# Patient Record
Sex: FEMALE | Race: WHITE | NOT HISPANIC OR LATINO | Employment: STUDENT | URBAN - METROPOLITAN AREA
[De-identification: names, ages, dates, MRNs, and addresses within clinical notes are randomized per-mention and may not be internally consistent; named-entity substitution may affect disease eponyms.]

---
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
## 2021-10-23 RX ORDER — NORGESTIMATE AND ETHINYL ESTRADIOL 0.25-0.035
KIT ORAL
COMMUNITY
End: 2021-11-17

## 2021-10-23 RX ORDER — NORELGESTROMIN AND ETHINYL ESTRADIOL 150; 35 UG/D; UG/D
PATCH TRANSDERMAL
COMMUNITY
Start: 2021-07-22 | End: 2023-10-02

## 2021-11-11 PROBLEM — A63.0 GENITAL WARTS: Status: ACTIVE | Noted: 2021-11-11

## 2022-01-05 PROBLEM — Z01.419 WELL WOMAN EXAM WITH ROUTINE GYNECOLOGICAL EXAM: Status: ACTIVE | Noted: 2022-01-05

## 2022-01-05 PROBLEM — S83.005A CLOSED DISLOCATION OF LEFT PATELLA: Status: RESOLVED | Noted: 2019-05-14 | Resolved: 2022-01-05

## 2022-03-14 PROBLEM — N90.89 VULVAR LESION: Status: ACTIVE | Noted: 2022-03-14

## 2022-03-14 PROBLEM — Z01.419 WELL WOMAN EXAM WITH ROUTINE GYNECOLOGICAL EXAM: Status: RESOLVED | Noted: 2022-01-05 | Resolved: 2022-03-14

## 2022-05-05 PROBLEM — L29.2 VULVAR ITCHING: Status: ACTIVE | Noted: 2022-05-05

## 2022-05-05 PROBLEM — K64.9 HEMORRHOIDS: Status: ACTIVE | Noted: 2022-05-05

## 2022-08-22 ENCOUNTER — OFFICE VISIT (OUTPATIENT)
Dept: SURGERY | Facility: CLINIC | Age: 22
End: 2022-08-22
Payer: COMMERCIAL

## 2022-08-22 VITALS — BODY MASS INDEX: 20.42 KG/M2 | WEIGHT: 126.56 LBS

## 2022-08-22 DIAGNOSIS — K64.9 HEMORRHOIDS, UNSPECIFIED HEMORRHOID TYPE: ICD-10-CM

## 2022-08-22 DIAGNOSIS — K59.00 CONSTIPATION, UNSPECIFIED CONSTIPATION TYPE: Primary | ICD-10-CM

## 2022-08-22 PROCEDURE — 99204 PR OFFICE/OUTPT VISIT, NEW, LEVL IV, 45-59 MIN: ICD-10-PCS | Mod: 25,S$GLB,, | Performed by: COLON & RECTAL SURGERY

## 2022-08-22 PROCEDURE — 1159F MED LIST DOCD IN RCRD: CPT | Mod: CPTII,S$GLB,, | Performed by: COLON & RECTAL SURGERY

## 2022-08-22 PROCEDURE — 99999 PR PBB SHADOW E&M-EST. PATIENT-LVL III: ICD-10-PCS | Mod: PBBFAC,,, | Performed by: COLON & RECTAL SURGERY

## 2022-08-22 PROCEDURE — 46600 DIAGNOSTIC ANOSCOPY SPX: CPT | Mod: S$GLB,,, | Performed by: COLON & RECTAL SURGERY

## 2022-08-22 PROCEDURE — 99204 OFFICE O/P NEW MOD 45 MIN: CPT | Mod: 25,S$GLB,, | Performed by: COLON & RECTAL SURGERY

## 2022-08-22 PROCEDURE — 99999 PR PBB SHADOW E&M-EST. PATIENT-LVL III: CPT | Mod: PBBFAC,,, | Performed by: COLON & RECTAL SURGERY

## 2022-08-22 PROCEDURE — 3008F BODY MASS INDEX DOCD: CPT | Mod: CPTII,S$GLB,, | Performed by: COLON & RECTAL SURGERY

## 2022-08-22 PROCEDURE — 1159F PR MEDICATION LIST DOCUMENTED IN MEDICAL RECORD: ICD-10-PCS | Mod: CPTII,S$GLB,, | Performed by: COLON & RECTAL SURGERY

## 2022-08-22 PROCEDURE — 46600 PR DIAG2STIC A2SCOPY: ICD-10-PCS | Mod: S$GLB,,, | Performed by: COLON & RECTAL SURGERY

## 2022-08-22 PROCEDURE — 3008F PR BODY MASS INDEX (BMI) DOCUMENTED: ICD-10-PCS | Mod: CPTII,S$GLB,, | Performed by: COLON & RECTAL SURGERY

## 2022-08-22 RX ORDER — SPIRONOLACTONE 100 MG/1
100 TABLET, FILM COATED ORAL DAILY
COMMUNITY
End: 2023-10-02

## 2022-08-22 NOTE — PROGRESS NOTES
History & Physical    SUBJECTIVE:     History of Present Illness:  Patient is a 21 y.o. female presents for evaluation of rectal bleeding and pain. She also believes she can feel an external lesion at time. The bleeding is only with bowel movements, both when wiping and in the toilet. The pain occurs only with bowel movements and feels like she is passing razor blades. The rectal symptoms have been occurring since last November, and her constipation has been worse over the past few months. She has a bowel movement about 2 times per month, has hard skinny stools, strains, drinks about 1 gallon of water, sits for about 30 minutes, takes Miralax about every other day and stool softeners daily, and uses toilet paper only. She had a colonoscopy in 2017 with unknown results and for an unknown reason per the patient. She has followed with GI for her constipation in the past and has taken high doses of Linzess without success although not consistently. She has an uncle with colon cancer. She does have associated abdominal pain when she has not have a bowel movement in a while. She denies fevers, chills, nausea, and vomiting.     Chief Complaint   Patient presents with    Hemorrhoids    Rectal Bleeding    Rectal Pain       Review of patient's allergies indicates:  No Known Allergies    Current Outpatient Medications   Medication Sig Dispense Refill    ISOtretinoin (ACCUTANE) 40 MG capsule Take 40 mg by mouth 2 (two) times daily.      norelgestromin-ethinyl estradiol (XULANE) 150-35 mcg/24 hr Xulane transdermal patch weekly 150-35 mcg/24 hr apply 1 patch by transdermal route once weekly for 3 weeks 7/22/2021  Active      acyclovir (ZOVIRAX) 400 MG tablet Take 1 tablet (400 mg total) by mouth every 8 (eight) hours. for 7 days 21 tablet 0    hydrocortisone 2.5 % cream Apply topically 2 (two) times daily. for 10 days 3.5 g 0    spironolactone (ALDACTONE) 100 MG tablet Take 100 mg by mouth once daily at 6am.       valACYclovir (VALTREX) 500 MG tablet Take 2 tablets (1,000 mg total) by mouth once daily. for 7 days 14 tablet 0     No current facility-administered medications for this visit.       Past Medical History:   Diagnosis Date    Herpes simplex 2/4/2021 3:25:25 PM    [facility] Historical - Quick Add: HSV (herpes simplex virus) infection-No Additional Notes    Herpes simplex 2/4/2021 3:25:25 PM    [facility] Historical - Quick Add: HSV (herpes simplex virus) infection-No Additional Notes    Herpes simplex virus (HSV) infection      No past surgical history on file.  Family History   Problem Relation Age of Onset    Ovarian cancer Paternal Grandmother     Breast cancer Maternal Grandmother     Cancer Maternal Grandmother      Social History     Tobacco Use    Smoking status: Never Smoker    Smokeless tobacco: Never Used   Substance Use Topics    Alcohol use: Yes    Drug use: Never        Review of Systems:  Review of Systems   Constitutional: Negative for activity change, appetite change, chills, fatigue, fever and unexpected weight change.   HENT: Negative for congestion, ear pain, sore throat and trouble swallowing.    Eyes: Negative for pain, redness and itching.   Respiratory: Negative for cough, shortness of breath, wheezing and stridor.    Cardiovascular: Negative for chest pain, palpitations and leg swelling.   Gastrointestinal: Positive for abdominal pain, anal bleeding, constipation and rectal pain. Negative for abdominal distention, diarrhea, nausea and vomiting.   Endocrine: Negative for cold intolerance, heat intolerance and polyuria.   Genitourinary: Negative for difficulty urinating, dysuria, flank pain, frequency, hematuria and urgency.   Musculoskeletal: Negative for gait problem, joint swelling and neck pain.   Skin: Negative for color change, pallor, rash and wound.   Allergic/Immunologic: Negative for environmental allergies and immunocompromised state.   Neurological: Negative for  dizziness, speech difficulty, weakness and numbness.   Hematological: Does not bruise/bleed easily.   Psychiatric/Behavioral: Negative for agitation, confusion and hallucinations.       OBJECTIVE:       Physical Exam:  Physical Exam  Vitals reviewed. Exam conducted with a chaperone present.   Constitutional:       General: She is not in acute distress.     Appearance: She is well-developed. She is not ill-appearing.   HENT:      Head: Normocephalic and atraumatic.      Right Ear: External ear normal.      Left Ear: External ear normal.   Eyes:      Extraocular Movements: Extraocular movements intact.      Conjunctiva/sclera: Conjunctivae normal.   Cardiovascular:      Rate and Rhythm: Normal rate.   Pulmonary:      Effort: Pulmonary effort is normal. No respiratory distress.   Abdominal:      General: There is no distension.      Palpations: Abdomen is soft.      Tenderness: There is no abdominal tenderness.   Genitourinary:     Comments: Anorectal: Small left posterior and right posterior soft, non-thrombosed external hemorrhoids with minimal TTP. ELAINA with good tone, no blood, and no palpable internal lesions.   Musculoskeletal:      Cervical back: Neck supple.   Skin:     General: Skin is warm and dry.   Neurological:      Mental Status: She is alert and oriented to person, place, and time.   Psychiatric:         Behavior: Behavior normal.       Anoscopy Procedure Note    Pre-procedure diagnosis: Rectal bleeding    Post-procedure diagnosis: Internal hemorrhoids    Procedure: Anoscopy    Surgeon: Randolph Moses MD    Assistant: Jimena Arreola PA-C    Specimen: none    Findings: Anoscope inserted and all 4 quadrants examined. Mildly enlarged right anterior, right posterior, and left lateral internal hemorrhoids without evidence of recent bleeding. No other internal masses or lesions.       ASSESSMENT/PLAN:     20 y/o female with internal/external hemorrhoidal disease and chronic constipation.     - Discussed  causes and treatment of hemorrhoidal disease. Advised that the most important step in improving her rectal symptoms is controlling her chronic constipation. Would not recommend aggressive interventions on her hemorrhoids such as banding or surgery as would need to treat underlying cause (constipation) first  - Refer to GI for constipation work-up.  - Discussed that a minimum I would recommend behavioral, lifestyle and medication modifications to improve bowel habits. Usual bowel management handout given to patient. This includes a stool softener twice per day, fiber powder supplementation daily, drinking at least 64oz of water/day, avoiding straining with bowel movements, spending less than 5 min on toilet per bowel movement, eating a high fiber diet, using miralax as needed to achieve a bowel movement daily and using wet wipes to wipe after bowel movements when irritated.   - RTC PRN    Randolph Moses MD  Colon and Rectal Surgery  [facility]

## 2022-08-25 ENCOUNTER — OFFICE VISIT (OUTPATIENT)
Dept: GASTROENTEROLOGY | Facility: CLINIC | Age: 22
End: 2022-08-25
Payer: COMMERCIAL

## 2022-08-25 VITALS
SYSTOLIC BLOOD PRESSURE: 118 MMHG | BODY MASS INDEX: 20.35 KG/M2 | HEIGHT: 66 IN | DIASTOLIC BLOOD PRESSURE: 84 MMHG | WEIGHT: 126.63 LBS

## 2022-08-25 DIAGNOSIS — R14.0 ABDOMINAL BLOATING: ICD-10-CM

## 2022-08-25 DIAGNOSIS — K64.9 HEMORRHOIDS, UNSPECIFIED HEMORRHOID TYPE: ICD-10-CM

## 2022-08-25 DIAGNOSIS — K59.00 CONSTIPATION, UNSPECIFIED CONSTIPATION TYPE: Primary | ICD-10-CM

## 2022-08-25 DIAGNOSIS — K62.5 RECTAL BLEEDING: ICD-10-CM

## 2022-08-25 PROCEDURE — 99204 PR OFFICE/OUTPT VISIT, NEW, LEVL IV, 45-59 MIN: ICD-10-PCS | Mod: S$GLB,,, | Performed by: INTERNAL MEDICINE

## 2022-08-25 PROCEDURE — 1159F MED LIST DOCD IN RCRD: CPT | Mod: CPTII,S$GLB,, | Performed by: INTERNAL MEDICINE

## 2022-08-25 PROCEDURE — 99999 PR PBB SHADOW E&M-EST. PATIENT-LVL IV: CPT | Mod: PBBFAC,,, | Performed by: INTERNAL MEDICINE

## 2022-08-25 PROCEDURE — 1159F PR MEDICATION LIST DOCUMENTED IN MEDICAL RECORD: ICD-10-PCS | Mod: CPTII,S$GLB,, | Performed by: INTERNAL MEDICINE

## 2022-08-25 PROCEDURE — 99204 OFFICE O/P NEW MOD 45 MIN: CPT | Mod: S$GLB,,, | Performed by: INTERNAL MEDICINE

## 2022-08-25 PROCEDURE — 3074F PR MOST RECENT SYSTOLIC BLOOD PRESSURE < 130 MM HG: ICD-10-PCS | Mod: CPTII,S$GLB,, | Performed by: INTERNAL MEDICINE

## 2022-08-25 PROCEDURE — 3079F DIAST BP 80-89 MM HG: CPT | Mod: CPTII,S$GLB,, | Performed by: INTERNAL MEDICINE

## 2022-08-25 PROCEDURE — 3008F BODY MASS INDEX DOCD: CPT | Mod: CPTII,S$GLB,, | Performed by: INTERNAL MEDICINE

## 2022-08-25 PROCEDURE — 99999 PR PBB SHADOW E&M-EST. PATIENT-LVL IV: ICD-10-PCS | Mod: PBBFAC,,, | Performed by: INTERNAL MEDICINE

## 2022-08-25 PROCEDURE — 3008F PR BODY MASS INDEX (BMI) DOCUMENTED: ICD-10-PCS | Mod: CPTII,S$GLB,, | Performed by: INTERNAL MEDICINE

## 2022-08-25 PROCEDURE — 3074F SYST BP LT 130 MM HG: CPT | Mod: CPTII,S$GLB,, | Performed by: INTERNAL MEDICINE

## 2022-08-25 PROCEDURE — 3079F PR MOST RECENT DIASTOLIC BLOOD PRESSURE 80-89 MM HG: ICD-10-PCS | Mod: CPTII,S$GLB,, | Performed by: INTERNAL MEDICINE

## 2022-08-25 RX ORDER — LUBIPROSTONE 24 UG/1
24 CAPSULE ORAL 2 TIMES DAILY
Qty: 60 CAPSULE | Refills: 2 | Status: ON HOLD | OUTPATIENT
Start: 2022-08-25 | End: 2022-08-30 | Stop reason: HOSPADM

## 2022-08-25 RX ORDER — SODIUM, POTASSIUM,MAG SULFATES 17.5-3.13G
1 SOLUTION, RECONSTITUTED, ORAL ORAL DAILY
Qty: 1 KIT | Refills: 0 | Status: SHIPPED | OUTPATIENT
Start: 2022-08-25 | End: 2022-08-27

## 2022-08-25 NOTE — PROGRESS NOTES
[facility]  Gastroenterology    Patient evaluated at the request of Jimena Arreola PA-C  [address] [facility] [place] [address]    PCP: Brown Porter MD    8/25/22    Reason for Visit: Constipation    Subjective:   Kiersten Ochoa is a 21 y.o. female here for evaluation of constipation. States she has had issues with constipation for years but worsened over the past year. Has a Bm once every 1-2 weeks and that is with OTC laxatives and enemas. She takes Miralax and stool softeners daily. +abdominal pain when constipation is severe. +abdominal bloating after eating. Has tried Linzess in the past with up titration of dose and it lost effect. Also had a colonoscopy a few years ago but she cannot remember why because her constipation was not that bad. Thinks may be due to FH of colon issues in an uncle. No prior EGD. Also with some new onset rectal bleeding- happens at times with BMs and fills the toilet bowl. She saw CRS and was found to have hemorrhoids.       Past Medical History:   Diagnosis Date    Herpes simplex 2/4/2021 3:25:25 PM    [facility] Historical - Quick Add: HSV (herpes simplex virus) infection-No Additional Notes    Herpes simplex 2/4/2021 3:25:25 PM    [facility] Historical - Quick Add: HSV (herpes simplex virus) infection-No Additional Notes    Herpes simplex virus (HSV) infection        No past surgical history on file.    Current Outpatient Medications on File Prior to Visit   Medication Sig Dispense Refill    acyclovir (ZOVIRAX) 400 MG tablet Take 1 tablet (400 mg total) by mouth every 8 (eight) hours. for 7 days 21 tablet 0    hydrocortisone 2.5 % cream Apply topically 2 (two) times daily. for 10 days 3.5 g 0    norelgestromin-ethinyl estradiol (XULANE) 150-35 mcg/24 hr Xulane transdermal patch weekly 150-35 mcg/24 hr apply 1 patch by transdermal route once weekly for 3 weeks 7/22/2021  Active      spironolactone (ALDACTONE) 100 MG tablet Take 100 mg by  mouth once daily at 6am.      ISOtretinoin (ACCUTANE) 40 MG capsule Take 40 mg by mouth 2 (two) times daily.      [DISCONTINUED] valACYclovir (VALTREX) 500 MG tablet Take 2 tablets (1,000 mg total) by mouth once daily. for 7 days 14 tablet 0     No current facility-administered medications on file prior to visit.       Review of patient's allergies indicates:  No Known Allergies    Social History     Socioeconomic History    Marital status: Single   Tobacco Use    Smoking status: Never Smoker    Smokeless tobacco: Never Used   Substance and Sexual Activity    Alcohol use: Yes    Drug use: Never    Sexual activity: Yes     Partners: Male     Birth control/protection: Patch       Family History   Problem Relation Age of Onset    Ovarian cancer Paternal Grandmother     Breast cancer Maternal Grandmother     Cancer Maternal Grandmother        Review of Systems   Constitutional: Negative for appetite change, fever and unexpected weight change.   HENT: Negative for postnasal drip, rhinorrhea, sneezing, sore throat and trouble swallowing.    Eyes: Negative for visual disturbance.   Respiratory: Negative for cough, shortness of breath and wheezing.    Cardiovascular: Negative for chest pain, palpitations and leg swelling.   Gastrointestinal: Positive for abdominal distention, abdominal pain, anal bleeding and constipation. Negative for blood in stool, diarrhea, nausea and vomiting.   Genitourinary: Negative for dysuria.   Musculoskeletal: Negative for arthralgias, joint swelling and myalgias.   Skin: Negative for color change, pallor and rash.   Neurological: Negative for weakness, light-headedness, numbness and headaches.   Hematological: Negative for adenopathy. Does not bruise/bleed easily.   Psychiatric/Behavioral: Negative for agitation.           Objective:   Vitals:   Vitals:    08/25/22 0750   BP: 118/84       Physical Exam  Vitals reviewed.   Constitutional:       General: She is not in acute distress.      Appearance: She is not diaphoretic.   HENT:      Head: Normocephalic and atraumatic.      Mouth/Throat:      Pharynx: No oropharyngeal exudate.   Eyes:      General: No scleral icterus.        Right eye: No discharge.         Left eye: No discharge.      Conjunctiva/sclera: Conjunctivae normal.      Pupils: Pupils are equal, round, and reactive to light.   Cardiovascular:      Rate and Rhythm: Normal rate and regular rhythm.      Heart sounds: Normal heart sounds. No murmur heard.    No friction rub. No gallop.   Pulmonary:      Effort: Pulmonary effort is normal. No respiratory distress.      Breath sounds: Normal breath sounds. No stridor. No wheezing or rales.   Abdominal:      General: Bowel sounds are normal. There is no distension.      Palpations: Abdomen is soft. There is no mass.      Tenderness: There is no abdominal tenderness. There is no guarding.   Musculoskeletal:         General: Normal range of motion.      Cervical back: Normal range of motion.   Skin:     General: Skin is warm and dry.      Coloration: Skin is not pale.      Findings: No erythema or rash.   Neurological:      Mental Status: She is alert and oriented to person, place, and time.         IMPRESSION     Problem List Items Addressed This Visit        GI    Hemorrhoids      Other Visit Diagnoses     Constipation, unspecified constipation type    -  Primary    Relevant Orders    Ambulatory referral/consult to Endo Procedure     Case Request Endoscopy: EGD (ESOPHAGOGASTRODUODENOSCOPY), COLONOSCOPY (Completed)    Rectal bleeding        Relevant Orders    Ambulatory referral/consult to Endo Procedure     Case Request Endoscopy: EGD (ESOPHAGOGASTRODUODENOSCOPY), COLONOSCOPY (Completed)    Abdominal bloating        Relevant Orders    Ambulatory referral/consult to Endo Procedure     Case Request Endoscopy: EGD (ESOPHAGOGASTRODUODENOSCOPY), COLONOSCOPY (Completed)          PLANS:    - Failed Miralax, mag citrate,  enemas and Linzess. Start Amitiza 24 mcg po BID. Possible side effects of diarrhea and/or abdominal cramping discussed  - Schedule for EGD and colonoscopy (EGD for postprandial bloating). Will do Suprep with extended prep  - Discussed pending endoscopy results, may need further work-up of constipation with anorectal manometry  - Continue with high fiber intake and plenty of fluid intake  - Further recommendations pending the above    Constipation, unspecified constipation type  -     Ambulatory referral/consult to Gastroenterology  -     Ambulatory referral/consult to Endo Procedure ; Future; Expected date: 08/26/2022  -     Case Request Endoscopy: EGD (ESOPHAGOGASTRODUODENOSCOPY), COLONOSCOPY    Rectal bleeding  -     Ambulatory referral/consult to Endo Procedure ; Future; Expected date: 08/26/2022  -     Case Request Endoscopy: EGD (ESOPHAGOGASTRODUODENOSCOPY), COLONOSCOPY    Abdominal bloating  -     Ambulatory referral/consult to Endo Procedure ; Future; Expected date: 08/26/2022  -     Case Request Endoscopy: EGD (ESOPHAGOGASTRODUODENOSCOPY), COLONOSCOPY    Hemorrhoids, unspecified hemorrhoid type    Other orders  -     sodium,potassium,mag sulfates (SUPREP BOWEL PREP KIT) 17.5-3.13-1.6 gram SolR; Take 177 mLs by mouth once daily. for 2 days  Dispense: 1 kit; Refill: 0  -     lubiprostone (AMITIZA) 24 MCG Cap; Take 1 capsule (24 mcg total) by mouth 2 (two) times daily.  Dispense: 60 capsule; Refill: 2      Iram Back MD  Gastroenterology and Hepatology

## 2022-08-29 ENCOUNTER — ANESTHESIA EVENT (OUTPATIENT)
Dept: ENDOSCOPY | Facility: HOSPITAL | Age: 22
End: 2022-08-29
Payer: COMMERCIAL

## 2022-08-30 ENCOUNTER — ANESTHESIA (OUTPATIENT)
Dept: ENDOSCOPY | Facility: HOSPITAL | Age: 22
End: 2022-08-30
Payer: COMMERCIAL

## 2022-08-30 ENCOUNTER — HOSPITAL ENCOUNTER (OUTPATIENT)
Facility: HOSPITAL | Age: 22
Discharge: HOME | End: 2022-08-30
Attending: INTERNAL MEDICINE | Admitting: INTERNAL MEDICINE
Payer: COMMERCIAL

## 2022-08-30 DIAGNOSIS — K62.5 RECTAL BLEEDING: ICD-10-CM

## 2022-08-30 DIAGNOSIS — R14.0 POSTPRANDIAL ABDOMINAL BLOATING: ICD-10-CM

## 2022-08-30 DIAGNOSIS — K59.04 CHRONIC IDIOPATHIC CONSTIPATION: Primary | ICD-10-CM

## 2022-08-30 LAB
B-HCG UR QL: NEGATIVE
CTP QC/QA: YES
CTP QC/QA: YES
SARS-COV-2 AG RESP QL IA.RAPID: NEGATIVE

## 2022-08-30 PROCEDURE — 88305 TISSUE EXAM BY PATHOLOGIST: CPT | Mod: 26,,, | Performed by: PATHOLOGY

## 2022-08-30 PROCEDURE — 43239 EGD BIOPSY SINGLE/MULTIPLE: CPT | Performed by: INTERNAL MEDICINE

## 2022-08-30 PROCEDURE — 45378 PR COLONOSCOPY,DIAGNOSTIC: ICD-10-PCS | Mod: ,,, | Performed by: INTERNAL MEDICINE

## 2022-08-30 PROCEDURE — 25000003 PHARM REV CODE 250: Performed by: NURSE ANESTHETIST, CERTIFIED REGISTERED

## 2022-08-30 PROCEDURE — 88305 TISSUE EXAM BY PATHOLOGIST: ICD-10-PCS | Mod: 26,,, | Performed by: PATHOLOGY

## 2022-08-30 PROCEDURE — 37000009 HC ANESTHESIA EA ADD 15 MINS: Performed by: INTERNAL MEDICINE

## 2022-08-30 PROCEDURE — 43239 EGD BIOPSY SINGLE/MULTIPLE: CPT | Mod: 51,,, | Performed by: INTERNAL MEDICINE

## 2022-08-30 PROCEDURE — 27201012 HC FORCEPS, HOT/COLD, DISP: Performed by: INTERNAL MEDICINE

## 2022-08-30 PROCEDURE — 37000008 HC ANESTHESIA 1ST 15 MINUTES: Performed by: INTERNAL MEDICINE

## 2022-08-30 PROCEDURE — 45378 DIAGNOSTIC COLONOSCOPY: CPT | Mod: ,,, | Performed by: INTERNAL MEDICINE

## 2022-08-30 PROCEDURE — 63600175 PHARM REV CODE 636 W HCPCS: Performed by: NURSE ANESTHETIST, CERTIFIED REGISTERED

## 2022-08-30 PROCEDURE — 43239 PR EGD, FLEX, W/BIOPSY, SGL/MULTI: ICD-10-PCS | Mod: 51,,, | Performed by: INTERNAL MEDICINE

## 2022-08-30 PROCEDURE — 81025 URINE PREGNANCY TEST: CPT | Performed by: INTERNAL MEDICINE

## 2022-08-30 PROCEDURE — 45378 DIAGNOSTIC COLONOSCOPY: CPT | Performed by: INTERNAL MEDICINE

## 2022-08-30 PROCEDURE — 88305 TISSUE EXAM BY PATHOLOGIST: CPT | Performed by: PATHOLOGY

## 2022-08-30 RX ORDER — LIDOCAINE HYDROCHLORIDE 10 MG/ML
INJECTION, SOLUTION EPIDURAL; INFILTRATION; INTRACAUDAL; PERINEURAL
Status: DISCONTINUED | OUTPATIENT
Start: 2022-08-30 | End: 2022-08-30

## 2022-08-30 RX ORDER — SODIUM CHLORIDE, SODIUM LACTATE, POTASSIUM CHLORIDE, CALCIUM CHLORIDE 600; 310; 30; 20 MG/100ML; MG/100ML; MG/100ML; MG/100ML
INJECTION, SOLUTION INTRAVENOUS CONTINUOUS
Status: DISCONTINUED | OUTPATIENT
Start: 2022-08-30 | End: 2022-08-30 | Stop reason: HOSPADM

## 2022-08-30 RX ORDER — PLECANATIDE 3 MG/1
3 TABLET ORAL DAILY
Qty: 30 TABLET | Refills: 3 | Status: SHIPPED | OUTPATIENT
Start: 2022-08-30 | End: 2022-09-29

## 2022-08-30 RX ORDER — SODIUM CHLORIDE 9 MG/ML
INJECTION, SOLUTION INTRAVENOUS CONTINUOUS
Status: DISCONTINUED | OUTPATIENT
Start: 2022-08-30 | End: 2022-08-30 | Stop reason: HOSPADM

## 2022-08-30 RX ORDER — PROPOFOL 10 MG/ML
VIAL (ML) INTRAVENOUS
Status: DISCONTINUED | OUTPATIENT
Start: 2022-08-30 | End: 2022-08-30

## 2022-08-30 RX ADMIN — PROPOFOL 40 MG: 10 INJECTION, EMULSION INTRAVENOUS at 10:08

## 2022-08-30 RX ADMIN — GLYCOPYRROLATE 0.2 MG: 0.2 INJECTION, SOLUTION INTRAMUSCULAR; INTRAVITREAL at 10:08

## 2022-08-30 RX ADMIN — LIDOCAINE HYDROCHLORIDE 50 MG: 10 INJECTION, SOLUTION EPIDURAL; INFILTRATION; INTRACAUDAL; PERINEURAL at 10:08

## 2022-08-30 RX ADMIN — PROPOFOL 100 MG: 10 INJECTION, EMULSION INTRAVENOUS at 10:08

## 2022-08-30 RX ADMIN — SODIUM CHLORIDE, SODIUM LACTATE, POTASSIUM CHLORIDE, AND CALCIUM CHLORIDE: .6; .31; .03; .02 INJECTION, SOLUTION INTRAVENOUS at 10:08

## 2022-08-30 NOTE — [employer]OVATION PATIENT INSTRUCTIONS
Discharge Summary/Instructions after an Endoscopic Procedure  Patient Name: Kiersten Ochoa  Patient MRN: [id number]  Patient YOB: 2000 Tuesday, August 30, 2022 Iram Back MD  Dear patient,  As a result of recent federal legislation (The Federal Cures Act), you may   receive lab or pathology results from your procedure in your [facility]   account before your physician is able to contact you. Your physician or   their representative will relay the results to you with their   recommendations at their soonest availability.  Thank you,  RESTRICTIONS:  During your procedure today, you received medications for sedation.  These   medications may affect your judgment, balance and coordination.  Therefore,   for 24 hours, you have the following restrictions:   - DO NOT drive a car, operate machinery, make legal/financial decisions,   sign important papers or drink alcohol.    ACTIVITY:  Today: no heavy lifting, straining or running due to procedural   sedation/anesthesia.  The following day: return to full activity including work.  DIET:  Eat and drink normally unless instructed otherwise.     TREATMENT FOR COMMON SIDE EFFECTS:  - Mild abdominal pain, nausea, belching, bloating or excessive gas:  rest,   eat lightly and use a heating pad.  - Sore Throat: treat with throat lozenges and/or gargle with warm salt   water.  - Because air was used during the procedure, expelling large amounts of air   from your rectum or belching is normal.  - If a bowel prep was taken, you may not have a bowel movement for 1-3 days.    This is normal.  SYMPTOMS TO WATCH FOR AND REPORT TO YOUR PHYSICIAN:  1. Abdominal pain or bloating, other than gas cramps.  2. Chest pain.  3. Back pain.  4. Signs of infection such as: chills or fever occurring within 24 hours   after the procedure.  5. Rectal bleeding, which would show as bright red, maroon, or black stools.   (A tablespoon of blood from the rectum is not serious, especially  if   hemorrhoids are present.)  6. Vomiting.  7. Weakness or dizziness.  GO DIRECTLY TO THE NEAREST EMERGENCY ROOM IF YOU HAVE ANY OF THE FOLLOWING:      Difficulty breathing              Chills and/or fever over 101 F   Persistent vomiting and/or vomiting blood   Severe abdominal pain   Severe chest pain   Black, tarry stools   Bleeding- more than one tablespoon   Any other symptom or condition that you feel may need urgent attention  Your doctor recommends these additional instructions:  If any biopsies were taken, your doctors clinic will contact you in 1 to 2   weeks with any results.  - Discharge patient to home (via wheelchair).   - Resume previous diet.   - Continue present medications.   - Repeat colonoscopy at age 46 for screening purposes.   - Patient has a contact number available for emergencies.  The signs and   symptoms of potential delayed complications were discussed with the   patient.  Return to normal activities tomorrow.  Written discharge   instructions were provided to the patient.  For questions, problems or results please call your physician Iram Back MD at Work:  [phone]  If you have any questions about the above instructions, call the GI   department at [phone] or call the endoscopy unit at [phone]   from 7am until 3 pm.  [facility], EMERGENCY ROOM PHONE NUMBER:   [phone]  IF A COMPLICATION OR EMERGENCY SITUATION ARISES AND YOU ARE UNABLE TO REACH   YOUR PHYSICIAN - GO DIRECTLY TO THE EMERGENCY ROOM.  I have read or have had read to me these discharge instructions for my   procedure and have received a written copy.  I understand these   instructions and will follow-up with my physician if I have any questions.     __________________________________       _____________________________________  Nurse Signature                                          Patient/Designated   Responsible Party Signature  MD Iram Dyson  IRENE Back MD  8/30/2022 11:06:32 AM  [employer]

## 2022-08-30 NOTE — PROVATION PATIENT INSTRUCTIONS
Discharge Summary/Instructions after an Endoscopic Procedure  Patient Name: Kiersten Ochoa  Patient MRN: [id number]  Patient YOB: 2000 Tuesday, August 30, 2022 Iram Back MD  Dear patient,  As a result of recent federal legislation (The Federal Cures Act), you may   receive lab or pathology results from your procedure in your [facility]   account before your physician is able to contact you. Your physician or   their representative will relay the results to you with their   recommendations at their soonest availability.  Thank you,  RESTRICTIONS:  During your procedure today, you received medications for sedation.  These   medications may affect your judgment, balance and coordination.  Therefore,   for 24 hours, you have the following restrictions:   - DO NOT drive a car, operate machinery, make legal/financial decisions,   sign important papers or drink alcohol.    ACTIVITY:  Today: no heavy lifting, straining or running due to procedural   sedation/anesthesia.  The following day: return to full activity including work.  DIET:  Eat and drink normally unless instructed otherwise.     TREATMENT FOR COMMON SIDE EFFECTS:  - Mild abdominal pain, nausea, belching, bloating or excessive gas:  rest,   eat lightly and use a heating pad.  - Sore Throat: treat with throat lozenges and/or gargle with warm salt   water.  - Because air was used during the procedure, expelling large amounts of air   from your rectum or belching is normal.  - If a bowel prep was taken, you may not have a bowel movement for 1-3 days.    This is normal.  SYMPTOMS TO WATCH FOR AND REPORT TO YOUR PHYSICIAN:  1. Abdominal pain or bloating, other than gas cramps.  2. Chest pain.  3. Back pain.  4. Signs of infection such as: chills or fever occurring within 24 hours   after the procedure.  5. Rectal bleeding, which would show as bright red, maroon, or black stools.   (A tablespoon of blood from the rectum is not serious, especially  if   hemorrhoids are present.)  6. Vomiting.  7. Weakness or dizziness.  GO DIRECTLY TO THE NEAREST EMERGENCY ROOM IF YOU HAVE ANY OF THE FOLLOWING:      Difficulty breathing              Chills and/or fever over 101 F   Persistent vomiting and/or vomiting blood   Severe abdominal pain   Severe chest pain   Black, tarry stools   Bleeding- more than one tablespoon   Any other symptom or condition that you feel may need urgent attention  Your doctor recommends these additional instructions:  If any biopsies were taken, your doctors clinic will contact you in 1 to 2   weeks with any results.  - Discharge patient to home (via wheelchair).   - Resume previous diet.   - Continue present medications.   - Await pathology results.   - Telephone GI clinic for pathology results in 2 weeks.   - Patient has a contact number available for emergencies.  The signs and   symptoms of potential delayed complications were discussed with the   patient.  Return to normal activities tomorrow.  Written discharge   instructions were provided to the patient.  For questions, problems or results please call your physician Iram Back MD at Work:  [phone]  If you have any questions about the above instructions, call the GI   department at [phone] or call the endoscopy unit at [phone]   from 7am until 3 pm.  [facility], EMERGENCY ROOM PHONE NUMBER:   [phone]  IF A COMPLICATION OR EMERGENCY SITUATION ARISES AND YOU ARE UNABLE TO REACH   YOUR PHYSICIAN - GO DIRECTLY TO THE EMERGENCY ROOM.  I have read or have had read to me these discharge instructions for my   procedure and have received a written copy.  I understand these   instructions and will follow-up with my physician if I have any questions.     __________________________________       _____________________________________  Nurse Signature                                          Patient/Designated   Responsible Party  Signature  MD Iram Dyson MD  8/30/2022 11:07:59 AM  [employer]

## 2022-08-30 NOTE — ANESTHESIA POSTPROCEDURE EVALUATION
Anesthesia Post Evaluation    Patient: Kiersten Ochoa    Procedure(s) Performed: Procedure(s) (LRB):  EGD (ESOPHAGOGASTRODUODENOSCOPY) (N/A)  COLONOSCOPY (N/A)    Final Anesthesia Type: MAC      Patient location during evaluation: PACU  Patient participation: Yes- Able to Participate  Level of consciousness: awake and alert  Post-procedure vital signs: reviewed and stable  Pain management: adequate  Airway patency: patent    PONV status at discharge: No PONV  Anesthetic complications: no      Cardiovascular status: blood pressure returned to baseline  Respiratory status: unassisted and room air  Hydration status: euvolemic  Follow-up not needed.          Vitals Value Taken Time   /75 08/30/22 1000   Temp 37.2 °C (99 °F) 08/30/22 1000   Pulse 67 08/30/22 1000   Resp 12 08/30/22 1000   SpO2 99 % 08/30/22 1000         No case tracking events are documented in the log.      Pain/Sean Score: No data recorded

## 2022-08-30 NOTE — TRANSFER OF CARE
"Anesthesia Transfer of Care Note    Patient: Kiersten Ochoa    Procedure(s) Performed: Procedure(s) (LRB):  EGD (ESOPHAGOGASTRODUODENOSCOPY) (N/A)  COLONOSCOPY (N/A)    Patient location: PACU    Anesthesia Type: MAC    Transport from OR: Transported from OR on room air with adequate spontaneous ventilation    Post pain: adequate analgesia    Post assessment: no apparent anesthetic complications    Post vital signs: stable    Level of consciousness: responds to stimulation    Nausea/Vomiting: no nausea/vomiting    Complications: none    Transfer of care protocol was followed      Last vitals:   Visit Vitals  /75 (BP Location: Left arm, Patient Position: Lying)   Pulse 67   Temp 37.2 °C (99 °F)   Resp 12   Ht 5' 6" (1.676 m)   Wt 56.7 kg (125 lb)   LMP 07/28/2022 (Approximate)   SpO2 99%   Breastfeeding No   BMI 20.18 kg/m²     "

## 2022-08-30 NOTE — H&P
Short Stay Endoscopy History and Physical    PCP - Brown Porter MD    Procedure - EGD and colonoscopy  ASA - 1  Mallampati - per anesthesia  History of Anesthesia problems - no  Family history Anesthesia problems -  no     HPI:  This is a 21 y.o. female here for evaluation of :   Active Hospital Problems    Diagnosis  POA    *Chronic idiopathic constipation [K59.04]  No    Postprandial abdominal bloating [R14.0]  No    Rectal bleeding [K62.5]  No      Resolved Hospital Problems   No resolved problems to display.         Health Maintenance         Date Due Completion Date    Hepatitis C Screening Never done ---    Lipid Panel Never done ---    COVID-19 Vaccine (1) Never done ---    HIV Screening Never done ---    HPV Vaccines (3 - 3-dose series) 05/11/2022 1/11/2022    Influenza Vaccine (1) 09/01/2022 ---    Chlamydia Screening 05/05/2023 5/5/2022    TETANUS VACCINE 07/29/2023 7/29/2013            ROS:  CONSTITUTIONAL: Denies weight change,  fatigue, fevers, chills, night sweats.  CARDIOVASCULAR: Denies chest pain, shortness of breath, orthopnea and edema.  RESPIRATORY: Denies cough, hemoptysis, dyspnea, and wheezing.  GI: See HPI.    Medical History:   Past Medical History:   Diagnosis Date    Herpes simplex 2/4/2021 3:25:25 PM    [facility] Historical - Quick Add: HSV (herpes simplex virus) infection-No Additional Notes    Herpes simplex 2/4/2021 3:25:25 PM    [facility] Historical - Quick Add: HSV (herpes simplex virus) infection-No Additional Notes    Herpes simplex virus (HSV) infection        Surgical History:   History reviewed. No pertinent surgical history.    Family History:   Family History   Problem Relation Age of Onset    Ovarian cancer Paternal Grandmother     Breast cancer Maternal Grandmother     Cancer Maternal Grandmother        Social History:   Social History     Tobacco Use    Smoking status: Never    Smokeless tobacco: Never   Substance Use Topics    Alcohol use: Yes    Drug use: Never        Allergies:   Review of patient's allergies indicates:  No Known Allergies    Medications:   No current facility-administered medications on file prior to encounter.     Current Outpatient Medications on File Prior to Encounter   Medication Sig Dispense Refill    spironolactone (ALDACTONE) 100 MG tablet Take 100 mg by mouth once daily at 6am.      acyclovir (ZOVIRAX) 400 MG tablet Take 1 tablet (400 mg total) by mouth every 8 (eight) hours. for 7 days 21 tablet 0    hydrocortisone 2.5 % cream Apply topically 2 (two) times daily. for 10 days 3.5 g 0    ISOtretinoin (ACCUTANE) 40 MG capsule Take 40 mg by mouth 2 (two) times daily.      lubiprostone (AMITIZA) 24 MCG Cap Take 1 capsule (24 mcg total) by mouth 2 (two) times daily. 60 capsule 2    norelgestromin-ethinyl estradiol (XULANE) 150-35 mcg/24 hr Xulane transdermal patch weekly 150-35 mcg/24 hr apply 1 patch by transdermal route once weekly for 3 weeks 7/22/2021  Active         Physical Exam:  Vital Signs:   Vitals:    08/30/22 1000   BP: 124/75   Pulse: 67   Resp: 12   Temp: 99 °F (37.2 °C)     General Appearance: Well appearing in no acute distress  ENT: OP clear  Chest: CTA B  CV: RRR, no m/r/g  Abd: s/nt/nd/nabs  Ext: no edema    Labs:Reviewed    IMP:  Active Hospital Problems    Diagnosis  POA    *Chronic idiopathic constipation [K59.04]  No    Postprandial abdominal bloating [R14.0]  No    Rectal bleeding [K62.5]  No      Resolved Hospital Problems   No resolved problems to display.         Plan:   I have explained the risks and benefits of upper endoscopy and colonoscopy to the patient including but not limited to bleeding, perforation, infection, and death. The patient wishes to proceed.

## 2022-08-30 NOTE — ANESTHESIA PREPROCEDURE EVALUATION
08/30/2022  Kiersten Ochoa is a 21 y.o., female.      Pre-op Assessment    I have reviewed the Patient Summary Reports.     I have reviewed the Nursing Notes. I have reviewed the NPO Status.   I have reviewed the Medications.     Review of Systems  Anesthesia Hx:  No problems with previous Anesthesia  Denies Family Hx of Anesthesia complications.   Denies Personal Hx of Anesthesia complications.   Social:  Non-Smoker    Hematology/Oncology:     Oncology Normal     EENT/Dental:EENT/Dental Normal   Cardiovascular:  Cardiovascular Normal Exercise tolerance: good     Pulmonary:  Pulmonary Normal    Renal/:  Renal/ Normal     Hepatic/GI:  Hepatic/GI Normal  Constipation, unspecified constipation type (K59.00)       Rectal bleeding (K62.5)       Abdominal bloating (R14.0)   Musculoskeletal:  Musculoskeletal Normal    Neurological:  Neurology Normal    Endocrine:  Endocrine Normal    Dermatological:  Skin Normal    Psych:  Psychiatric Normal           Physical Exam  General: Well nourished, Cooperative, Alert and Oriented    Airway:  Mallampati: II   Mouth Opening: Normal  TM Distance: Normal  Tongue: Normal  Neck ROM: Normal ROM    Dental:  Intact    Chest/Lungs:  Clear to auscultation, Normal Respiratory Rate    Heart:  Rate: Normal  Rhythm: Regular Rhythm        Anesthesia Plan  Type of Anesthesia, risks & benefits discussed:    Anesthesia Type: MAC  Intra-op Monitoring Plan: Standard ASA Monitors  Induction:  IV  Informed Consent: Informed consent signed with the Patient and all parties understand the risks and agree with anesthesia plan.  All questions answered.   ASA Score: 2  Day of Surgery Review of History & Physical: H&P Update referred to the surgeon/provider.I have interviewed and examined the patient. I have reviewed the patient's H&P dated: There are no significant changes.     Ready For  Surgery From Anesthesia Perspective.     .

## 2022-08-30 NOTE — DISCHARGE SUMMARY
[facility] - Endoscopy (Hospital)  Discharge Note  Short Stay    Procedure(s) (LRB):  EGD (ESOPHAGOGASTRODUODENOSCOPY) (N/A)  COLONOSCOPY (N/A)    OUTCOME: Patient tolerated treatment/procedure well without complication and is now ready for discharge.    DISPOSITION: Home or Self Care    FINAL DIAGNOSIS:  Chronic idiopathic constipation    FOLLOWUP: With primary care provider    DISCHARGE INSTRUCTIONS:  No discharge procedures on file.

## 2022-08-31 VITALS
TEMPERATURE: 99 F | SYSTOLIC BLOOD PRESSURE: 122 MMHG | HEIGHT: 66 IN | BODY MASS INDEX: 20.09 KG/M2 | OXYGEN SATURATION: 100 % | DIASTOLIC BLOOD PRESSURE: 57 MMHG | WEIGHT: 125 LBS | HEART RATE: 99 BPM | RESPIRATION RATE: 18 BRPM

## 2022-09-02 ENCOUNTER — TELEPHONE (OUTPATIENT)
Dept: PREADMISSION TESTING | Facility: HOSPITAL | Age: 22
End: 2022-09-02
Payer: COMMERCIAL

## 2022-09-02 ENCOUNTER — HOSPITAL ENCOUNTER (OUTPATIENT)
Dept: PREADMISSION TESTING | Facility: HOSPITAL | Age: 22
Discharge: HOME | End: 2022-09-02
Attending: INTERNAL MEDICINE
Payer: COMMERCIAL

## 2022-09-02 DIAGNOSIS — R14.0 ABDOMINAL BLOATING: ICD-10-CM

## 2022-09-02 DIAGNOSIS — K62.5 RECTAL BLEEDING: ICD-10-CM

## 2022-09-02 DIAGNOSIS — K59.00 CONSTIPATION, UNSPECIFIED CONSTIPATION TYPE: ICD-10-CM

## 2022-09-02 LAB
FINAL PATHOLOGIC DIAGNOSIS: NORMAL
GROSS: NORMAL
Lab: NORMAL

## 2022-09-02 NOTE — TELEPHONE ENCOUNTER
Called patient at pat appointment to schedule endoscopy procedures, she declined.  Stated she just had both procedures done.

## 2022-12-05 PROBLEM — K62.5 RECTAL BLEEDING: Status: RESOLVED | Noted: 2022-08-30 | Resolved: 2022-12-05

## 2022-12-29 PROBLEM — N63.0 BREAST LUMP: Status: ACTIVE | Noted: 2022-12-29

## 2022-12-29 PROBLEM — L29.2 VULVAR ITCHING: Status: RESOLVED | Noted: 2022-05-05 | Resolved: 2022-12-29

## 2022-12-29 PROBLEM — N90.89 VULVAR LESION: Status: RESOLVED | Noted: 2022-03-14 | Resolved: 2022-12-29

## 2023-10-02 PROBLEM — N76.0 ACUTE VAGINITIS: Status: ACTIVE | Noted: 2023-10-02

## 2024-12-06 PROBLEM — G89.29 CHRONIC PELVIC PAIN IN FEMALE: Status: ACTIVE | Noted: 2024-12-06

## 2024-12-06 PROBLEM — R30.0 DYSURIA: Status: ACTIVE | Noted: 2024-12-06

## 2024-12-06 PROBLEM — R10.2 CHRONIC PELVIC PAIN IN FEMALE: Status: ACTIVE | Noted: 2024-12-06

## 2024-12-06 PROBLEM — K59.00 CONSTIPATION: Status: ACTIVE | Noted: 2024-12-06

## 2024-12-06 PROBLEM — R32 UI (URINARY INCONTINENCE): Status: ACTIVE | Noted: 2024-12-06

## 2024-12-06 PROBLEM — K92.1 BLOODY STOOL: Status: ACTIVE | Noted: 2024-12-06

## 2024-12-09 ENCOUNTER — HOSPITAL ENCOUNTER (OUTPATIENT)
Dept: RADIOLOGY | Facility: HOSPITAL | Age: 24
Discharge: HOME | End: 2024-12-09
Attending: UROLOGY
Payer: COMMERCIAL

## 2024-12-09 ENCOUNTER — TELEPHONE (OUTPATIENT)
Dept: UROLOGY | Facility: CLINIC | Age: 24
End: 2024-12-09

## 2024-12-09 ENCOUNTER — PATIENT MESSAGE (OUTPATIENT)
Dept: UROLOGY | Facility: CLINIC | Age: 24
End: 2024-12-09

## 2024-12-09 ENCOUNTER — OFFICE VISIT (OUTPATIENT)
Dept: UROLOGY | Facility: CLINIC | Age: 24
End: 2024-12-09
Payer: COMMERCIAL

## 2024-12-09 VITALS — HEIGHT: 66 IN | BODY MASS INDEX: 18.63 KG/M2 | WEIGHT: 115.94 LBS

## 2024-12-09 DIAGNOSIS — R10.2 SUPRAPUBIC PAIN: ICD-10-CM

## 2024-12-09 DIAGNOSIS — N23 RENAL COLIC: ICD-10-CM

## 2024-12-09 DIAGNOSIS — R32 URINARY INCONTINENCE, UNSPECIFIED TYPE: Primary | ICD-10-CM

## 2024-12-09 LAB
BILIRUB UR QL STRIP: NEGATIVE
GLUCOSE UR QL STRIP: NEGATIVE
KETONES UR QL STRIP: NEGATIVE
LEUKOCYTE ESTERASE UR QL STRIP: NEGATIVE
PH, POC UA: 5.5
POC BLOOD, URINE: NEGATIVE
POC NITRATES, URINE: NEGATIVE
PROT UR QL STRIP: NEGATIVE
SP GR UR STRIP: 1.03 (ref 1–1.03)
UROBILINOGEN UR STRIP-ACNC: 0.2 (ref 0.1–1.1)

## 2024-12-09 PROCEDURE — 99999 PR PBB SHADOW E&M-EST. PATIENT-LVL III: CPT | Mod: PBBFAC,,, | Performed by: UROLOGY

## 2024-12-09 PROCEDURE — 81003 URINALYSIS AUTO W/O SCOPE: CPT | Mod: QW,S$GLB,, | Performed by: UROLOGY

## 2024-12-09 PROCEDURE — 3008F BODY MASS INDEX DOCD: CPT | Mod: CPTII,S$GLB,, | Performed by: UROLOGY

## 2024-12-09 PROCEDURE — 74176 CT ABD & PELVIS W/O CONTRAST: CPT | Mod: 26,,, | Performed by: RADIOLOGY

## 2024-12-09 PROCEDURE — 99204 OFFICE O/P NEW MOD 45 MIN: CPT | Mod: S$GLB,,, | Performed by: UROLOGY

## 2024-12-09 PROCEDURE — 1159F MED LIST DOCD IN RCRD: CPT | Mod: CPTII,S$GLB,, | Performed by: UROLOGY

## 2024-12-09 PROCEDURE — 74176 CT ABD & PELVIS W/O CONTRAST: CPT | Mod: TC

## 2024-12-09 RX ORDER — CEFDINIR 300 MG/1
300 CAPSULE ORAL 2 TIMES DAILY
Qty: 20 CAPSULE | Refills: 0 | Status: SHIPPED | OUTPATIENT
Start: 2024-12-09 | End: 2024-12-19

## 2024-12-09 NOTE — PROGRESS NOTES
Chief Complaint:   Encounter Diagnoses   Name Primary?    Urinary incontinence, unspecified type Yes    Suprapubic pain     Renal colic        HPI:  24-year-old female who comes in with acute episodes of right lower pelvic pain, bilateral renal colic pain, lower suprapubic discomfort.  She has recently been evaluated by her gynecologist, obstetric ultrasound was normal except for an ovarian cyst.  Patient states she is also having extreme pain with intercourse, which as suprapubic tenderness.  Patient notes this is severe, previous attempts with the same partner caused no such symptoms.  She has noted that she even has sharp pain in the suprapubic region with part cough or laughing, even if she has low-pressure upon this area.  Again this all started about a month ago, no real eliciting or inciting factors.  Patient notes that she has also noticed an increase in frequency with the urgency, occasional urge incontinence.  She is getting up 3 times per night to void now going about every hour and a half during the daytime.  No gross hematuria, no dysuria, no history of smoking, no previous problems such as this before.  She was in middle school she was diagnosed with what she states was spina bifida, but no neurological issues.  Patient's suffers from severe constipation is being evaluated by GI here in a couple weeks.  No previous urological history.  Strong family history of female cancers, father has had stones.  Currently no recent history of antibiotic usage.    Allergies:  Patient has no known allergies.    Medications:  See MAR    Review of Systems:  General: No fever, chills, fatigability, or weight loss.  Skin: No rashes, itching, or changes in color or texture of skin.  Chest: Denies TAVARES, cyanosis, wheezing, cough, and sputum production.  Abdomen: Appetite fine. No weight loss. Denies diarrhea, abdominal pain, hematemesis, or blood in stool.  Musculoskeletal: No joint stiffness or swelling. Denies back  pain.  : As above.  All other review of systems negative.    PMH:   has a past medical history of Herpes simplex (2/4/2021 3:25:25 PM), Herpes simplex (2/4/2021 3:25:25 PM), and Herpes simplex virus (HSV) infection.    PSH:   has a past surgical history that includes Esophagogastroduodenoscopy (N/A, 8/30/2022) and Colonoscopy (N/A, 8/30/2022).    FamHx: family history includes Breast cancer in her maternal grandmother; Cancer in her maternal grandmother; Ovarian cancer in her paternal grandmother.    SocHx:  reports that she has never smoked. She has never used smokeless tobacco. She reports current alcohol use. She reports that she does not use drugs.      Physical Exam:  There were no vitals filed for this visit.  General: A&Ox3, no apparent distress, no deformities  Neck: No masses, normal ROM  Lungs: normal inspiration, no use of accessory muscles  Heart: normal pulse, no arrhythmias  Abdomen: Soft, NT, ND, pain upon palpation of the entire lower abdomen above pubic symphysis, bilateral mild costovertebral angle tenderness  Skin: The skin is warm and dry. No jaundice.  Ext: No c/c/e.    Labs/Studies:   UA normal 12/24    Impression/Plan:     1. Suprapubic pain/renal colic- no personal history of stones but she does have a family history of stones.  Obtain a CT stone protocol, will also be able to evaluate pelvic anatomy as well, of note recent obstetric ultrasound was negative.  Additionally we will treat with cefdinir b.i.d. times 10 days, as despite not having dysuria this could be due to cystitis.  Aggressive constipation control, as this will also exacerbate the discomfort that she is describing.  She follows up with GI for the 1st time in a few weeks.  Of note she does have significant constipation.  See her back once completed, further therapeutic and diagnostic options will be made available, if this does not improve.  Of note her pain, voiding and dyspareunia have only been present for about 1  month.    2. Urgency/urge incontinence- please see above.

## 2024-12-09 NOTE — TELEPHONE ENCOUNTER
----- Message from Jabier Garcia MD sent at 12/9/2024 12:21 PM CST -----  No issues, continue with abx and reassess afterwards